# Patient Record
Sex: MALE | Race: WHITE | NOT HISPANIC OR LATINO | Employment: OTHER | URBAN - METROPOLITAN AREA
[De-identification: names, ages, dates, MRNs, and addresses within clinical notes are randomized per-mention and may not be internally consistent; named-entity substitution may affect disease eponyms.]

---

## 2023-12-07 PROBLEM — I25.10 CORONARY ARTERY DISEASE: Status: ACTIVE | Noted: 2018-08-09

## 2023-12-07 PROBLEM — I25.810 CORONARY ARTERY DISEASE INVOLVING AUTOLOGOUS ARTERY CORONARY BYPASS GRAFT WITHOUT ANGINA PECTORIS: Status: ACTIVE | Noted: 2018-08-09

## 2023-12-07 PROBLEM — H35.3130 AGE-RELATED MACULAR DEGENERATION, DRY, BOTH EYES: Status: ACTIVE | Noted: 2023-12-07

## 2023-12-07 PROBLEM — M17.11 PRIMARY OSTEOARTHRITIS OF RIGHT KNEE: Status: ACTIVE | Noted: 2023-12-07

## 2023-12-07 PROBLEM — I25.10 3-VESSEL CAD: Status: ACTIVE | Noted: 2023-12-07

## 2023-12-07 PROBLEM — R93.2 ABNORMAL CHOLANGIOGRAM: Status: ACTIVE | Noted: 2023-12-07

## 2023-12-07 PROBLEM — K83.1 COMMON BILE DUCT OBSTRUCTION (CMS-HCC): Status: ACTIVE | Noted: 2023-12-07

## 2023-12-07 PROBLEM — I25.10 3-VESSEL CAD: Status: RESOLVED | Noted: 2023-12-07 | Resolved: 2023-12-07

## 2023-12-07 PROBLEM — R42 EPISODIC LIGHTHEADEDNESS: Status: ACTIVE | Noted: 2023-12-07

## 2023-12-07 PROBLEM — M17.12 PRIMARY OSTEOARTHRITIS OF LEFT KNEE: Status: ACTIVE | Noted: 2023-12-07

## 2023-12-07 PROBLEM — I10 BENIGN ESSENTIAL HYPERTENSION: Status: ACTIVE | Noted: 2023-12-07

## 2023-12-07 PROBLEM — Z95.1 S/P CABG X 3: Status: ACTIVE | Noted: 2023-12-07

## 2023-12-07 PROBLEM — E11.9 DIABETES MELLITUS TYPE 2, CONTROLLED, WITHOUT COMPLICATIONS (MULTI): Status: ACTIVE | Noted: 2019-04-17

## 2023-12-12 ENCOUNTER — OFFICE VISIT (OUTPATIENT)
Dept: CARDIOLOGY | Facility: CLINIC | Age: 73
End: 2023-12-12
Payer: MEDICARE

## 2023-12-12 VITALS
HEIGHT: 69 IN | OXYGEN SATURATION: 98 % | WEIGHT: 192 LBS | DIASTOLIC BLOOD PRESSURE: 62 MMHG | SYSTOLIC BLOOD PRESSURE: 130 MMHG | HEART RATE: 57 BPM | BODY MASS INDEX: 28.44 KG/M2

## 2023-12-12 DIAGNOSIS — I25.10 CORONARY ARTERY DISEASE INVOLVING NATIVE CORONARY ARTERY OF NATIVE HEART, UNSPECIFIED WHETHER ANGINA PRESENT: ICD-10-CM

## 2023-12-12 DIAGNOSIS — I10 BENIGN ESSENTIAL HYPERTENSION: Primary | ICD-10-CM

## 2023-12-12 DIAGNOSIS — Z95.1 S/P CABG X 3: ICD-10-CM

## 2023-12-12 DIAGNOSIS — E78.2 MIXED HYPERLIPIDEMIA: ICD-10-CM

## 2023-12-12 PROCEDURE — 1160F RVW MEDS BY RX/DR IN RCRD: CPT | Performed by: INTERNAL MEDICINE

## 2023-12-12 PROCEDURE — 1036F TOBACCO NON-USER: CPT | Performed by: INTERNAL MEDICINE

## 2023-12-12 PROCEDURE — 3078F DIAST BP <80 MM HG: CPT | Performed by: INTERNAL MEDICINE

## 2023-12-12 PROCEDURE — 3075F SYST BP GE 130 - 139MM HG: CPT | Performed by: INTERNAL MEDICINE

## 2023-12-12 PROCEDURE — 99213 OFFICE O/P EST LOW 20 MIN: CPT | Performed by: INTERNAL MEDICINE

## 2023-12-12 PROCEDURE — 1159F MED LIST DOCD IN RCRD: CPT | Performed by: INTERNAL MEDICINE

## 2023-12-12 RX ORDER — METFORMIN HYDROCHLORIDE 1000 MG/1
1000 TABLET ORAL
COMMUNITY

## 2023-12-12 RX ORDER — METOPROLOL TARTRATE 25 MG/1
25 TABLET, FILM COATED ORAL 2 TIMES DAILY
COMMUNITY
Start: 2023-10-28 | End: 2024-02-05 | Stop reason: SDUPTHER

## 2023-12-12 RX ORDER — TAMSULOSIN HYDROCHLORIDE 0.4 MG/1
0.4 CAPSULE ORAL DAILY
COMMUNITY

## 2023-12-12 RX ORDER — OMEGA-3-ACID ETHYL ESTERS 1 G/1
2 CAPSULE, LIQUID FILLED ORAL 2 TIMES DAILY
COMMUNITY
End: 2024-01-05 | Stop reason: SDUPTHER

## 2023-12-12 RX ORDER — ROSUVASTATIN CALCIUM 40 MG/1
40 TABLET, COATED ORAL DAILY
COMMUNITY

## 2023-12-12 RX ORDER — FERROUS SULFATE 325(65) MG
1 TABLET ORAL
COMMUNITY
Start: 2022-06-01

## 2023-12-12 RX ORDER — ASPIRIN 81 MG/1
81 TABLET ORAL
COMMUNITY
Start: 2018-08-09

## 2023-12-12 RX ORDER — FOLIC ACID 1 MG/1
1 TABLET ORAL DAILY
COMMUNITY
End: 2024-03-01 | Stop reason: SDUPTHER

## 2023-12-12 NOTE — PROGRESS NOTES
"Chief Complaint:   Follow-up (Bruce is here for a 6 month follow up )     History Of Present Illness:    Bruce Dobbins is a 73 y.o. male presenting with cv dz.  Patient denies chest pain/SOB/palpitations/dizziness/lightheadedness/edema/claudication  Active- 2 times per week     Last Recorded Vitals:  Vitals:    12/12/23 1037 12/12/23 1050   BP: 140/78 130/62   BP Location: Left arm    Patient Position: Sitting    BP Cuff Size: Adult    Pulse: 57    SpO2: 98%    Weight: 87.1 kg (192 lb)    Height: 1.753 m (5' 9\")             Allergies:  Patient has no known allergies.    Outpatient Medications:  Current Outpatient Medications   Medication Instructions    aspirin 81 mg, oral, Daily RT    ferrous sulfate, 325 mg ferrous sulfate, tablet 1 tablet, oral, Daily with breakfast    folic acid (FOLVITE) 1 mg, oral, Daily, as directed    metFORMIN (GLUCOPHAGE) 1,000 mg, oral, 2 times daily with meals    metoprolol tartrate (LOPRESSOR) 25 mg, oral, 2 times daily    omega-3 acid ethyl esters (LOVAZA) 2 g, oral, 2 times daily    rosuvastatin (CRESTOR) 40 mg, oral, Daily    tamsulosin (FLOMAX) 0.4 mg, oral, Daily       Physical Exam:  Constitutional:       Appearance: Healthy appearance. Overweight and not in distress.   Neck:      Vascular: No JVR. JVD normal.   Pulmonary:      Effort: Pulmonary effort is normal.      Breath sounds: Normal breath sounds. No wheezing. No rhonchi. No rales.   Chest:      Chest wall: Not tender to palpatation.   Cardiovascular:      PMI at left midclavicular line. Normal rate. Regular rhythm. Normal S1. Normal S2.       Murmurs: There is no murmur.      No gallop.  No click. No rub.   Pulses:     Intact distal pulses.   Edema:     Peripheral edema absent.   Abdominal:      General: Bowel sounds are normal.      Palpations: Abdomen is soft.      Tenderness: There is no abdominal tenderness.   Musculoskeletal: Normal range of motion.         General: No tenderness. Skin:     General: " Skin is warm and dry.   Neurological:      General: No focal deficit present.      Mental Status: Alert and oriented to person, place and time.          Last Labs:  CBC -  Lab Results   Component Value Date    WBC 11.4 (H) 05/13/2021    HGB 14.0 05/13/2021    HCT 40.3 (L) 05/13/2021    MCV 93 05/13/2021     05/13/2021       CMP -  Lab Results   Component Value Date    CALCIUM 9.7 05/13/2021    PROT 6.8 05/13/2021    ALBUMIN 4.2 05/13/2021    AST 21 05/13/2021    ALT 19 05/13/2021    ALKPHOS 67 05/13/2021    BILITOT 0.5 05/13/2021       LIPID PANEL -   2/2023  LDL=46  RENAL FUNCTION PANEL -   Lab Results   Component Value Date    GLUCOSE 141 (H) 05/13/2021     (L) 05/13/2021    K 4.1 05/13/2021     05/13/2021    CO2 23 05/13/2021    ANIONGAP 13 05/13/2021    BUN 14 05/13/2021    CREATININE 0.96 05/13/2021    CALCIUM 9.7 05/13/2021    ALBUMIN 4.2 05/13/2021        Lab Results   Component Value Date    HGBA1C 6.4 (H) 07/17/2023           Lab review: I have personally reviewed the laboratory result(s)       Problem List Items Addressed This Visit       Benign essential hypertension - Primary    Overview     BP stable         Coronary artery disease    Overview     Patient s/p 7/31/2018 3V CABG with LIMA-LAD, SVG-PDA, L RADIAL-OM   Patient with no angina and good functional status On ASA / beta blocker / statin. Follow clinically.         Relevant Medications    metoprolol tartrate (Lopressor) 25 mg tablet    Mixed hyperlipidemia    Overview     TG elevated and has CAD / DM, thus mortality benefit from Vascepa … however, could not afford cost.  On Lovaza   2/2023  LDL=46 TG=99         S/P CABG x 3    Overview     7/31/2018 3V CABG: LIMA-LAD, SVG-PDA, L RADIAL-OM                 Bari Martel DO

## 2024-01-05 DIAGNOSIS — I10 BENIGN ESSENTIAL HYPERTENSION: ICD-10-CM

## 2024-01-05 RX ORDER — OMEGA-3-ACID ETHYL ESTERS 1 G/1
1 CAPSULE, LIQUID FILLED ORAL 2 TIMES DAILY
Qty: 360 CAPSULE | Refills: 3 | Status: SHIPPED | OUTPATIENT
Start: 2024-01-05

## 2024-02-01 ENCOUNTER — APPOINTMENT (OUTPATIENT)
Dept: PRIMARY CARE | Facility: CLINIC | Age: 74
End: 2024-02-01
Payer: MEDICARE

## 2024-02-05 DIAGNOSIS — Z95.1 S/P CABG X 3: ICD-10-CM

## 2024-02-05 DIAGNOSIS — I25.10 CORONARY ARTERY DISEASE INVOLVING NATIVE CORONARY ARTERY OF NATIVE HEART, UNSPECIFIED WHETHER ANGINA PRESENT: ICD-10-CM

## 2024-02-05 DIAGNOSIS — I10 BENIGN ESSENTIAL HYPERTENSION: ICD-10-CM

## 2024-02-05 RX ORDER — METOPROLOL TARTRATE 25 MG/1
25 TABLET, FILM COATED ORAL 2 TIMES DAILY
Qty: 180 TABLET | Refills: 3 | Status: SHIPPED | OUTPATIENT
Start: 2024-02-05

## 2024-03-01 DIAGNOSIS — Z95.1 S/P CABG X 3: ICD-10-CM

## 2024-03-06 RX ORDER — FOLIC ACID 1 MG/1
1 TABLET ORAL DAILY
Qty: 90 TABLET | Refills: 0 | Status: SHIPPED | OUTPATIENT
Start: 2024-03-06 | End: 2024-05-29 | Stop reason: SDUPTHER

## 2024-05-29 DIAGNOSIS — Z95.1 S/P CABG X 3: ICD-10-CM

## 2024-05-29 RX ORDER — FOLIC ACID 1 MG/1
TABLET ORAL
Qty: 90 TABLET | Refills: 0 | Status: SHIPPED | OUTPATIENT
Start: 2024-05-29

## 2024-06-12 ENCOUNTER — APPOINTMENT (OUTPATIENT)
Dept: CARDIOLOGY | Facility: CLINIC | Age: 74
End: 2024-06-12
Payer: MEDICARE

## 2024-06-12 VITALS
OXYGEN SATURATION: 97 % | SYSTOLIC BLOOD PRESSURE: 122 MMHG | DIASTOLIC BLOOD PRESSURE: 70 MMHG | WEIGHT: 190 LBS | HEART RATE: 58 BPM | BODY MASS INDEX: 28.06 KG/M2

## 2024-06-12 DIAGNOSIS — E78.2 MIXED HYPERLIPIDEMIA: ICD-10-CM

## 2024-06-12 DIAGNOSIS — I10 BENIGN ESSENTIAL HYPERTENSION: Primary | ICD-10-CM

## 2024-06-12 DIAGNOSIS — Z95.1 S/P CABG X 3: ICD-10-CM

## 2024-06-12 DIAGNOSIS — I25.10 CORONARY ARTERY DISEASE INVOLVING NATIVE CORONARY ARTERY OF NATIVE HEART, UNSPECIFIED WHETHER ANGINA PRESENT: ICD-10-CM

## 2024-06-12 PROBLEM — E53.8 B12 DEFICIENCY: Status: ACTIVE | Noted: 2024-04-05

## 2024-06-12 PROCEDURE — 93000 ELECTROCARDIOGRAM COMPLETE: CPT | Performed by: INTERNAL MEDICINE

## 2024-06-12 PROCEDURE — 1036F TOBACCO NON-USER: CPT | Performed by: INTERNAL MEDICINE

## 2024-06-12 PROCEDURE — 1160F RVW MEDS BY RX/DR IN RCRD: CPT | Performed by: INTERNAL MEDICINE

## 2024-06-12 PROCEDURE — 3074F SYST BP LT 130 MM HG: CPT | Performed by: INTERNAL MEDICINE

## 2024-06-12 PROCEDURE — 99213 OFFICE O/P EST LOW 20 MIN: CPT | Performed by: INTERNAL MEDICINE

## 2024-06-12 PROCEDURE — 3078F DIAST BP <80 MM HG: CPT | Performed by: INTERNAL MEDICINE

## 2024-06-12 PROCEDURE — 1159F MED LIST DOCD IN RCRD: CPT | Performed by: INTERNAL MEDICINE

## 2024-06-12 RX ORDER — DICLOFENAC SODIUM 10 MG/G
2 GEL TOPICAL 4 TIMES DAILY
COMMUNITY
Start: 2024-04-03

## 2024-06-12 RX ORDER — LANOLIN ALCOHOL/MO/W.PET/CERES
1000 CREAM (GRAM) TOPICAL
COMMUNITY
Start: 2024-04-05

## 2024-06-12 NOTE — PROGRESS NOTES
Chief Complaint:   Follow-up (Patient is here for a follow up)     History Of Present Illness:    Bruce Dobbins is a 73 y.o. male presenting with cv dz.  Patient denies chest pain/SOB/palpitations/dizziness/lightheadedness/edema/claudication  Active-works with        Last Recorded Vitals:  Vitals:    06/12/24 0921   BP: 153/84   BP Location: Right arm   Patient Position: Sitting   BP Cuff Size: Adult   Pulse: 58   SpO2: 97%   Weight: 86.2 kg (190 lb)            Allergies:  Patient has no known allergies.    Outpatient Medications:  Current Outpatient Medications   Medication Instructions    aspirin 81 mg, oral, Daily RT    cyanocobalamin (VITAMIN B-12) 1,000 mcg, oral, Daily RT    diclofenac sodium (VOLTAREN) 2 g, Topical, 4 times daily    ferrous sulfate, 325 mg ferrous sulfate, tablet 1 tablet, oral, Daily with breakfast    folic acid (Folvite) 1 mg tablet TAKE 1 TABLET(1 MG) BY MOUTH DAILY AS DIRECTED    metFORMIN (GLUCOPHAGE) 1,000 mg, oral, 2 times daily (morning and late afternoon)    metoprolol tartrate (LOPRESSOR) 25 mg, oral, 2 times daily    omega-3 acid ethyl esters (LOVAZA) 1 g, oral, 2 times daily    rosuvastatin (CRESTOR) 40 mg, oral, Daily    tamsulosin (FLOMAX) 0.4 mg, oral, Daily       Physical Exam:  Constitutional:       Appearance: Healthy appearance. Not in distress.   Neck:      Vascular: No JVR. JVD normal.   Pulmonary:      Effort: Pulmonary effort is normal.      Breath sounds: Normal breath sounds. No wheezing. No rhonchi. No rales.   Chest:      Chest wall: Not tender to palpatation.   Cardiovascular:      PMI at left midclavicular line. Normal rate. Regular rhythm. Normal S1. Normal S2.       Murmurs: There is no murmur.      No gallop.  No click. No rub.   Pulses:     Intact distal pulses.   Edema:     Peripheral edema absent.   Abdominal:      General: Bowel sounds are normal.      Palpations: Abdomen is soft.      Tenderness: There is no abdominal tenderness.    Musculoskeletal: Normal range of motion.         General: No tenderness. Skin:     General: Skin is warm and dry.   Neurological:      General: No focal deficit present.      Mental Status: Alert and oriented to person, place and time.          Last Labs:  CBC -  Lab Results   Component Value Date    WBC 11.4 (H) 05/13/2021    HGB 14.0 05/13/2021    HCT 40.3 (L) 05/13/2021    MCV 93 05/13/2021     05/13/2021       CMP -  Lab Results   Component Value Date    CALCIUM 9.7 05/13/2021    PROT 6.8 05/13/2021    ALBUMIN 4.2 05/13/2021    AST 21 05/13/2021    ALT 19 05/13/2021    ALKPHOS 67 05/13/2021    BILITOT 0.5 05/13/2021       LIPID PANEL -4/2024  Total equals 150  HDL equals 52  LDL equals 68  Triglycerides equal to 233      RENAL FUNCTION PANEL -   Lab Results   Component Value Date    GLUCOSE 141 (H) 05/13/2021     (L) 05/13/2021    K 4.1 05/13/2021     05/13/2021    CO2 23 05/13/2021    ANIONGAP 13 05/13/2021    BUN 14 05/13/2021    CREATININE 0.96 05/13/2021    CALCIUM 9.7 05/13/2021    ALBUMIN 4.2 05/13/2021        Lab Results   Component Value Date    HGBA1C 6.7 (H) 04/03/2024    HGBA1C 6.4 (H) 07/17/2023                 Lab review: I have personally reviewed the laboratory result(s)       Problem List Items Addressed This Visit       Coronary artery disease    Overview     Patient s/p 7/31/2018 3V CABG with LIMA-LAD, SVG-PDA, L RADIAL-OM   Patient with no angina and good functional status On ASA / beta blocker / statin. Follow clinically.         Relevant Orders    ECG 12 Lead (Completed)     Stay active      Bari Martel DO   No

## 2024-08-27 DIAGNOSIS — I25.10 CORONARY ARTERY DISEASE INVOLVING NATIVE CORONARY ARTERY OF NATIVE HEART, UNSPECIFIED WHETHER ANGINA PRESENT: ICD-10-CM

## 2024-08-27 DIAGNOSIS — E78.2 MIXED HYPERLIPIDEMIA: ICD-10-CM

## 2024-08-27 DIAGNOSIS — Z95.1 S/P CABG X 3: ICD-10-CM

## 2024-08-27 RX ORDER — ROSUVASTATIN CALCIUM 40 MG/1
40 TABLET, COATED ORAL DAILY
Qty: 90 TABLET | Refills: 2 | Status: SHIPPED | OUTPATIENT
Start: 2024-08-27

## 2024-08-27 RX ORDER — FOLIC ACID 1 MG/1
TABLET ORAL
Qty: 90 TABLET | Refills: 2 | Status: SHIPPED | OUTPATIENT
Start: 2024-08-27

## 2024-12-13 ENCOUNTER — APPOINTMENT (OUTPATIENT)
Dept: CARDIOLOGY | Facility: CLINIC | Age: 74
End: 2024-12-13
Payer: MEDICARE

## 2024-12-13 VITALS
WEIGHT: 184 LBS | BODY MASS INDEX: 27.17 KG/M2 | OXYGEN SATURATION: 97 % | SYSTOLIC BLOOD PRESSURE: 132 MMHG | DIASTOLIC BLOOD PRESSURE: 68 MMHG | HEART RATE: 73 BPM

## 2024-12-13 DIAGNOSIS — E78.2 MIXED HYPERLIPIDEMIA: Primary | ICD-10-CM

## 2024-12-13 DIAGNOSIS — Z95.1 S/P CABG X 3: ICD-10-CM

## 2024-12-13 DIAGNOSIS — I25.10 CORONARY ARTERY DISEASE INVOLVING NATIVE CORONARY ARTERY OF NATIVE HEART, UNSPECIFIED WHETHER ANGINA PRESENT: ICD-10-CM

## 2024-12-13 DIAGNOSIS — I10 BENIGN ESSENTIAL HYPERTENSION: ICD-10-CM

## 2024-12-13 PROCEDURE — 1159F MED LIST DOCD IN RCRD: CPT | Performed by: INTERNAL MEDICINE

## 2024-12-13 PROCEDURE — 99213 OFFICE O/P EST LOW 20 MIN: CPT | Performed by: INTERNAL MEDICINE

## 2024-12-13 PROCEDURE — G2211 COMPLEX E/M VISIT ADD ON: HCPCS | Performed by: INTERNAL MEDICINE

## 2024-12-13 PROCEDURE — 3078F DIAST BP <80 MM HG: CPT | Performed by: INTERNAL MEDICINE

## 2024-12-13 PROCEDURE — 3075F SYST BP GE 130 - 139MM HG: CPT | Performed by: INTERNAL MEDICINE

## 2024-12-13 NOTE — PROGRESS NOTES
Chief Complaint:   No chief complaint on file.     History Of Present Illness:    Bruce Dobbins is a 74 y.o. male presenting with cv dz.  Patient denies chest pain/SOB/palpitations/dizziness/lightheadedness/edema/claudication  Active-works with  twice per week       Last Recorded Vitals:  Vitals:    12/13/24 1002 12/13/24 1017   BP: 132/84 132/68   BP Location: Left arm    Patient Position: Sitting    Pulse: 73    SpO2: 97%    Weight: 83.5 kg (184 lb)             Allergies:  Patient has no known allergies.    Outpatient Medications:  Current Outpatient Medications   Medication Instructions    aspirin 81 mg, Daily RT    cyanocobalamin (VITAMIN B-12) 1,000 mcg, Daily RT    diclofenac sodium (VOLTAREN) 2 g, 4 times daily    ferrous sulfate, 325 mg ferrous sulfate, tablet 1 tablet, Daily with breakfast    folic acid (Folvite) 1 mg tablet TAKE 1 TABLET(1 MG) BY MOUTH DAILY AS DIRECTED    metFORMIN (GLUCOPHAGE) 1,000 mg, 2 times daily (morning and late afternoon)    metoprolol tartrate (LOPRESSOR) 25 mg, oral, 2 times daily    omega-3 acid ethyl esters (LOVAZA) 1 g, oral, 2 times daily    rosuvastatin (CRESTOR) 40 mg, oral, Daily    tamsulosin (FLOMAX) 0.4 mg, Daily       Physical Exam:  Constitutional:       Appearance: Healthy appearance. Not in distress.   Neck:      Vascular: No JVR. JVD normal.   Pulmonary:      Effort: Pulmonary effort is normal.      Breath sounds: Normal breath sounds. No wheezing. No rhonchi. No rales.   Chest:      Chest wall: Not tender to palpatation.   Cardiovascular:      PMI at left midclavicular line. Normal rate. Regular rhythm. Normal S1. Normal S2.       Murmurs: There is no murmur.      No gallop.  No click. No rub.   Pulses:     Intact distal pulses.   Edema:     Peripheral edema absent.   Abdominal:      General: Bowel sounds are normal.      Palpations: Abdomen is soft.      Tenderness: There is no abdominal tenderness.   Musculoskeletal: Normal range of motion.          General: No tenderness. Skin:     General: Skin is warm and dry.   Neurological:      General: No focal deficit present.      Mental Status: Alert and oriented to person, place and time.          Last Labs:  CBC -  Lab Results   Component Value Date    WBC 11.4 (H) 05/13/2021    HGB 14.0 05/13/2021    HCT 40.3 (L) 05/13/2021    MCV 93 05/13/2021     05/13/2021       CMP -  Lab Results   Component Value Date    CALCIUM 9.7 05/13/2021    PROT 6.8 05/13/2021    ALBUMIN 4.2 05/13/2021    AST 21 05/13/2021    ALT 19 05/13/2021    ALKPHOS 67 05/13/2021    BILITOT 0.5 05/13/2021       LIPID PANEL -4/2024  Total equals 150  HDL equals 52  LDL equals 68  Triglycerides equal to 233      RENAL FUNCTION PANEL -   Lab Results   Component Value Date    GLUCOSE 141 (H) 05/13/2021     (L) 05/13/2021    K 4.1 05/13/2021     05/13/2021    CO2 23 05/13/2021    ANIONGAP 13 05/13/2021    BUN 14 05/13/2021    CREATININE 0.96 05/13/2021    CALCIUM 9.7 05/13/2021    ALBUMIN 4.2 05/13/2021        Lab Results   Component Value Date    HGBA1C 6.7 (H) 04/03/2024    HGBA1C 6.4 (H) 07/17/2023                 Lab review: I have personally reviewed the laboratory result(s)       Problem List Items Addressed This Visit       Benign essential hypertension    Overview     BP stable         Coronary artery disease    Overview     Patient s/p 7/31/2018 3V CABG with LIMA-LAD, SVG-PDA, L RADIAL-OM   Patient with no angina and good functional status   On ASA / beta blocker / statin. Follow clinically.         Mixed hyperlipidemia - Primary    Overview     TG elevated and has CAD / DM, thus mortality benefit from Vascepa … however, could not afford cost.  On Lovaza   4/2024 LDL=68,GL=306  Recheck in April         S/P CABG x 3    Overview     7/31/2018 3V CABG: LIMA-LAD, SVG-PDA, L RADIAL-OM           Stay active  Lipids=April    Bari Martel DO

## 2025-01-09 DIAGNOSIS — I10 BENIGN ESSENTIAL HYPERTENSION: ICD-10-CM

## 2025-01-09 DIAGNOSIS — E78.2 MIXED HYPERLIPIDEMIA: ICD-10-CM

## 2025-01-09 DIAGNOSIS — I25.10 CORONARY ARTERY DISEASE INVOLVING NATIVE CORONARY ARTERY OF NATIVE HEART, UNSPECIFIED WHETHER ANGINA PRESENT: ICD-10-CM

## 2025-01-10 ENCOUNTER — TELEPHONE (OUTPATIENT)
Dept: CARDIOLOGY | Facility: CLINIC | Age: 75
End: 2025-01-10
Payer: MEDICARE

## 2025-01-10 NOTE — TELEPHONE ENCOUNTER
DDM sent an escript for lovaza    Med list states 1 capsule BID    I could not find in his notes where he lowered it from 2 caps BID    Looks like it was last sent to the pharmacy on 1-5-24 with directions of 1 BID and has been filed that way for the past year    BW done 4-3-24 his triglycerides were 233  Next BW due in April    Should he remain on 1 cap BID or increased to original dosage of 2 caps BID?

## 2025-01-14 RX ORDER — OMEGA-3-ACID ETHYL ESTERS 1 G/1
CAPSULE, LIQUID FILLED ORAL
Qty: 60 CAPSULE | Refills: 11 | Status: SHIPPED | OUTPATIENT
Start: 2025-01-14

## 2025-04-02 ENCOUNTER — TELEPHONE (OUTPATIENT)
Dept: CARDIOLOGY | Facility: CLINIC | Age: 75
End: 2025-04-02
Payer: MEDICARE

## 2025-04-02 NOTE — TELEPHONE ENCOUNTER
Patient's PCP recommended starting lisinopril to protect kidneys. Patient called asking if cardiologist is ok with this?

## 2025-05-20 ENCOUNTER — TELEPHONE (OUTPATIENT)
Dept: CARDIOLOGY | Facility: CLINIC | Age: 75
End: 2025-05-20
Payer: MEDICARE

## 2025-05-27 DIAGNOSIS — I25.10 CORONARY ARTERY DISEASE INVOLVING NATIVE CORONARY ARTERY OF NATIVE HEART, UNSPECIFIED WHETHER ANGINA PRESENT: ICD-10-CM

## 2025-05-27 RX ORDER — ROSUVASTATIN CALCIUM 40 MG/1
40 TABLET, COATED ORAL DAILY
Qty: 90 TABLET | Refills: 3 | Status: SHIPPED | OUTPATIENT
Start: 2025-05-27

## 2025-06-13 ENCOUNTER — APPOINTMENT (OUTPATIENT)
Dept: CARDIOLOGY | Facility: CLINIC | Age: 75
End: 2025-06-13
Payer: MEDICARE

## 2025-07-01 ENCOUNTER — OFFICE VISIT (OUTPATIENT)
Dept: CARDIOLOGY | Facility: CLINIC | Age: 75
End: 2025-07-01
Payer: MEDICARE

## 2025-07-01 VITALS
BODY MASS INDEX: 26.29 KG/M2 | SYSTOLIC BLOOD PRESSURE: 102 MMHG | OXYGEN SATURATION: 98 % | HEART RATE: 58 BPM | WEIGHT: 178 LBS | DIASTOLIC BLOOD PRESSURE: 62 MMHG

## 2025-07-01 DIAGNOSIS — I10 BENIGN ESSENTIAL HYPERTENSION: Primary | ICD-10-CM

## 2025-07-01 DIAGNOSIS — Z95.1 S/P CABG X 3: ICD-10-CM

## 2025-07-01 DIAGNOSIS — I25.10 CORONARY ARTERY DISEASE INVOLVING NATIVE CORONARY ARTERY OF NATIVE HEART, UNSPECIFIED WHETHER ANGINA PRESENT: ICD-10-CM

## 2025-07-01 DIAGNOSIS — E78.2 MIXED HYPERLIPIDEMIA: ICD-10-CM

## 2025-07-01 PROCEDURE — G2211 COMPLEX E/M VISIT ADD ON: HCPCS | Performed by: INTERNAL MEDICINE

## 2025-07-01 PROCEDURE — 99213 OFFICE O/P EST LOW 20 MIN: CPT | Performed by: INTERNAL MEDICINE

## 2025-07-01 PROCEDURE — 3078F DIAST BP <80 MM HG: CPT | Performed by: INTERNAL MEDICINE

## 2025-07-01 PROCEDURE — 1159F MED LIST DOCD IN RCRD: CPT | Performed by: INTERNAL MEDICINE

## 2025-07-01 PROCEDURE — 93005 ELECTROCARDIOGRAM TRACING: CPT | Performed by: INTERNAL MEDICINE

## 2025-07-01 PROCEDURE — 93010 ELECTROCARDIOGRAM REPORT: CPT | Performed by: INTERNAL MEDICINE

## 2025-07-01 PROCEDURE — 99212 OFFICE O/P EST SF 10 MIN: CPT | Mod: 25

## 2025-07-01 PROCEDURE — 1036F TOBACCO NON-USER: CPT | Performed by: INTERNAL MEDICINE

## 2025-07-01 PROCEDURE — 1160F RVW MEDS BY RX/DR IN RCRD: CPT | Performed by: INTERNAL MEDICINE

## 2025-07-01 PROCEDURE — 3074F SYST BP LT 130 MM HG: CPT | Performed by: INTERNAL MEDICINE

## 2025-07-01 NOTE — PROGRESS NOTES
Chief Complaint:   Follow-up, Hypertension, and Coronary Artery Disease     History Of Present Illness:    Bruce Dobbins is a 74 y.o. male presenting with cv dz.  Patient denies chest pain/SOB/palpitations/dizziness/lightheadedness/edema/claudication  Active-works with  twice per week       Last Recorded Vitals:  Vitals:    07/01/25 1301   BP: 128/68   BP Location: Left arm   Patient Position: Sitting   BP Cuff Size: Adult   Pulse: 58   SpO2: 98%   Weight: 80.7 kg (178 lb)            Allergies:  Patient has no known allergies.    Outpatient Medications:  Current Outpatient Medications   Medication Instructions    aspirin 81 mg, Daily RT    cyanocobalamin (VITAMIN B-12) 1,000 mcg, Daily RT    diclofenac sodium (VOLTAREN) 2 g, 4 times daily    ferrous sulfate, 325 mg ferrous sulfate, tablet 1 tablet, Daily with breakfast    folic acid (Folvite) 1 mg tablet TAKE 1 TABLET(1 MG) BY MOUTH DAILY AS DIRECTED    metFORMIN (GLUCOPHAGE) 1,000 mg, 2 times daily (morning and late afternoon)    metoprolol tartrate (LOPRESSOR) 25 mg, oral, 2 times daily    omega-3 acid ethyl esters (Lovaza) 1 gram capsule Take 1 capsule (1 gram) by mouth 2 times a day.    rosuvastatin (CRESTOR) 40 mg, oral, Daily    tamsulosin (FLOMAX) 0.4 mg, Daily       Physical Exam:  Constitutional:       Appearance: Healthy appearance. Not in distress.   Neck:      Vascular: No JVR. JVD normal.   Pulmonary:      Effort: Pulmonary effort is normal.      Breath sounds: Normal breath sounds. No wheezing. No rhonchi. No rales.   Chest:      Chest wall: Not tender to palpatation.   Cardiovascular:      PMI at left midclavicular line. Normal rate. Regular rhythm. Normal S1. Normal S2.       Murmurs: There is no murmur.      No gallop.  No click. No rub.   Pulses:     Intact distal pulses.   Edema:     Peripheral edema absent.   Abdominal:      General: Bowel sounds are normal.      Palpations: Abdomen is soft.      Tenderness: There is no abdominal  tenderness.   Musculoskeletal: Normal range of motion.         General: No tenderness. Skin:     General: Skin is warm and dry.   Neurological:      General: No focal deficit present.      Mental Status: Alert and oriented to person, place and time.          Last Labs:  CBC -  Lab Results   Component Value Date    WBC 11.4 (H) 05/13/2021    HGB 14.0 05/13/2021    HCT 40.3 (L) 05/13/2021    MCV 93 05/13/2021     05/13/2021       CMP -  Lab Results   Component Value Date    CALCIUM 9.7 05/13/2021    PROT 6.8 05/13/2021    ALBUMIN 4.2 05/13/2021    AST 21 05/13/2021    ALT 19 05/13/2021    ALKPHOS 67 05/13/2021    BILITOT 0.5 05/13/2021       LIPID PANEL -4/2025  Total equals 150  HDL equals 52  LDL equals 68  Triglycerides equal to 233      RENAL FUNCTION PANEL -   Lab Results   Component Value Date    GLUCOSE 141 (H) 05/13/2021     (L) 05/13/2021    K 4.1 05/13/2021     05/13/2021    CO2 23 05/13/2021    ANIONGAP 13 05/13/2021    BUN 14 05/13/2021    CREATININE 0.96 05/13/2021    CALCIUM 9.7 05/13/2021    ALBUMIN 4.2 05/13/2021 5/2025  Creatinine 1.18  Potassium 5    Lab Results   Component Value Date    HGBA1C 6.3 (H) 12/31/2024    HGBA1C 6.4 (H) 07/17/2023                 Lab review: I have personally reviewed the laboratory result(s)       Problem List Items Addressed This Visit       Coronary artery disease    Overview   Patient s/p 7/31/2018 3V CABG with LIMA-LAD, SVG-PDA, L RADIAL-OM   Patient with no angina and good functional status   On ASA / beta blocker / statin. Follow clinically.         Relevant Orders    ECG 12 lead (Clinic Performed)     Stay active  Lipids    Bari Martel DO

## 2025-07-06 LAB
ATRIAL RATE: 58 BPM
P AXIS: 73 DEGREES
P OFFSET: 186 MS
P ONSET: 128 MS
PR INTERVAL: 170 MS
Q ONSET: 213 MS
QRS COUNT: 9 BEATS
QRS DURATION: 146 MS
QT INTERVAL: 432 MS
QTC CALCULATION(BAZETT): 424 MS
QTC FREDERICIA: 426 MS
R AXIS: 48 DEGREES
T AXIS: 76 DEGREES
T OFFSET: 429 MS
VENTRICULAR RATE: 58 BPM

## 2025-07-11 LAB
CHOLEST SERPL-MCNC: 85 MG/DL
CHOLEST/HDLC SERPL: 2 (CALC)
HDLC SERPL-MCNC: 43 MG/DL
LDLC SERPL CALC-MCNC: 25 MG/DL (CALC)
NONHDLC SERPL-MCNC: 42 MG/DL (CALC)
TRIGL SERPL-MCNC: 90 MG/DL

## 2025-07-14 DIAGNOSIS — I25.10 CORONARY ARTERY DISEASE INVOLVING NATIVE CORONARY ARTERY OF NATIVE HEART, UNSPECIFIED WHETHER ANGINA PRESENT: ICD-10-CM

## 2025-07-14 RX ORDER — ROSUVASTATIN CALCIUM 20 MG/1
20 TABLET, COATED ORAL DAILY
Qty: 90 TABLET | Refills: 3 | Status: SHIPPED | OUTPATIENT
Start: 2025-07-14

## 2025-07-14 NOTE — TELEPHONE ENCOUNTER
----- Message from Bari Martel sent at 7/14/2025 12:09 PM EDT -----  Decrease rosuv to 20 mg daily  ----- Message -----  From: [facility] Results In  Sent: 7/11/2025  11:05 PM EDT  To: Bari Martel, DO

## 2025-08-27 DIAGNOSIS — Z95.1 S/P CABG X 3: ICD-10-CM

## 2025-08-27 RX ORDER — FOLIC ACID 1 MG/1
TABLET ORAL
Qty: 90 TABLET | Refills: 3 | Status: SHIPPED | OUTPATIENT
Start: 2025-08-27